# Patient Record
Sex: MALE | Race: WHITE | Employment: UNEMPLOYED | URBAN - NONMETROPOLITAN AREA
[De-identification: names, ages, dates, MRNs, and addresses within clinical notes are randomized per-mention and may not be internally consistent; named-entity substitution may affect disease eponyms.]

---

## 2018-01-15 ENCOUNTER — HOSPITAL ENCOUNTER (OUTPATIENT)
Dept: MRI IMAGING | Age: 43
Discharge: HOME | End: 2018-01-15
Payer: COMMERCIAL

## 2018-01-15 DIAGNOSIS — M54.10 RADICULAR SYNDROME OF LOWER LIMBS: ICD-10-CM

## 2018-01-15 PROCEDURE — 72148 MRI LUMBAR SPINE W/O DYE: CPT

## 2019-12-08 ENCOUNTER — HOSPITAL ENCOUNTER (EMERGENCY)
Age: 44
Discharge: HOME | End: 2019-12-08
Attending: EMERGENCY MEDICINE
Payer: COMMERCIAL

## 2019-12-08 VITALS
SYSTOLIC BLOOD PRESSURE: 154 MMHG | TEMPERATURE: 97.7 F | OXYGEN SATURATION: 95 % | DIASTOLIC BLOOD PRESSURE: 104 MMHG | HEART RATE: 88 BPM | BODY MASS INDEX: 58.43 KG/M2 | RESPIRATION RATE: 20 BRPM | WEIGHT: 315 LBS

## 2019-12-08 DIAGNOSIS — N30.01 ACUTE CYSTITIS WITH HEMATURIA: Primary | ICD-10-CM

## 2019-12-08 LAB
AMORPHOUS: ABNORMAL
BACTERIA: ABNORMAL
BILIRUBIN URINE: NEGATIVE
BLOOD, URINE: ABNORMAL
CASTS UA: ABNORMAL /LPF
CHARACTER, URINE: CLEAR
COLOR: YELLOW
CRYSTALS, UA: ABNORMAL
EPITHELIAL CELLS, UA: ABNORMAL /HPF
GLUCOSE, URINE: NEGATIVE MG/DL
KETONES, URINE: NEGATIVE
LEUKOCYTE ESTERASE, URINE: NEGATIVE
MUCUS: ABNORMAL
NITRITE, URINE: NEGATIVE
PH UA: 6 (ref 5–9)
PROTEIN UA: NEGATIVE MG/DL
RBC UA: ABNORMAL /HPF
REFLEX TO URINE C & S: ABNORMAL
SPECIFIC GRAVITY UA: 1.02 (ref 1–1.03)
UROBILINOGEN, URINE: 0.2 EU/DL (ref 0–1)
WBC UA: ABNORMAL /HPF

## 2019-12-08 PROCEDURE — 87077 CULTURE AEROBIC IDENTIFY: CPT

## 2019-12-08 PROCEDURE — 81001 URINALYSIS AUTO W/SCOPE: CPT

## 2019-12-08 PROCEDURE — 87086 URINE CULTURE/COLONY COUNT: CPT

## 2019-12-08 PROCEDURE — 87186 SC STD MICRODIL/AGAR DIL: CPT

## 2019-12-08 PROCEDURE — 99283 EMERGENCY DEPT VISIT LOW MDM: CPT

## 2019-12-08 RX ORDER — SULFAMETHOXAZOLE AND TRIMETHOPRIM 800; 160 MG/1; MG/1
1 TABLET ORAL 2 TIMES DAILY
Qty: 14 TABLET | Refills: 0 | Status: SHIPPED | OUTPATIENT
Start: 2019-12-08 | End: 2019-12-15

## 2019-12-10 LAB
ORGANISM: ABNORMAL
URINE CULTURE REFLEX: ABNORMAL

## 2023-10-11 ENCOUNTER — TELEMEDICINE (OUTPATIENT)
Dept: UROLOGY | Age: 48
End: 2023-10-11
Payer: COMMERCIAL

## 2023-10-11 DIAGNOSIS — R97.20 ELEVATED PSA: Primary | ICD-10-CM

## 2023-10-11 DIAGNOSIS — N39.0 RECURRENT UTI: ICD-10-CM

## 2023-10-11 PROCEDURE — 99204 OFFICE O/P NEW MOD 45 MIN: CPT | Performed by: UROLOGY

## 2023-10-11 RX ORDER — MELOXICAM 15 MG/1
15 TABLET ORAL DAILY
COMMUNITY
Start: 2023-05-01

## 2023-10-11 RX ORDER — OLMESARTAN MEDOXOMIL AND HYDROCHLOROTHIAZIDE 40/25 40; 25 MG/1; MG/1
1 TABLET ORAL DAILY
COMMUNITY
Start: 2023-09-20

## 2023-10-11 RX ORDER — ROPINIROLE 0.5 MG/1
TABLET, FILM COATED ORAL
COMMUNITY
Start: 2023-09-20

## 2023-10-11 NOTE — PROGRESS NOTES
Eber Richards MD   Urology Clinic office Visit      Patient:  Nakul Tanner  YOB: 1975  Date: 10/11/2023    HISTORY OF PRESENT ILLNESS:   The patient is a 51 y.o. male who presents today for evaluation of the following problem(s):      1. Elevated PSA    2. Recurrent UTI           Overall the problem(s) : are worsening. Associated Symptoms: No dysuria, gross hematuria. Pain Severity:      Summary of old records: N/A  (Patient's old records, notes and chart reviewed and summarized above.)      Onset 9/2023  PSA 7.32  Severity is described as moderate to severe. The course of symptoms over time is insidious. Alleviating factors: none  Worsening factors: none  Lower urinary tract symptoms: mild  Hx of rec UTIs- has not had one since 2 years ago  Lost 100 lbs    I independently reviewed and verified the images and reports from:    No results found. Last several PSA's:  No results found for: \"PSA\"    Last total testosterone:  No results found for: \"TESTOSTERONE\"    Urinalysis today:  No results found for this visit on 10/11/23. Last BUN and creatinine:  Lab Results   Component Value Date    BUN 14 05/27/2015     Lab Results   Component Value Date    CREATININE 0.7 05/27/2015       Imaging Reviewed during this Office Visit:   (results were independently reviewed by physician and radiology report verified)    PAST MEDICAL, FAMILY AND SOCIAL HISTORY:  No past medical history on file. No past surgical history on file.   Family History   Problem Relation Age of Onset    High Blood Pressure Mother     Diabetes Father     Diabetes Paternal Grandfather      Outpatient Medications Marked as Taking for the 10/11/23 encounter (Telemedicine) with Sheldon Thompson MD   Medication Sig Dispense Refill    olmesartan-hydroCHLOROthiazide (BENICAR HCT) 40-25 MG per tablet Take 1 tablet by mouth daily      rOPINIRole (REQUIP) 0.5 MG tablet take 1/2 tablet by mouth at bedtime --MAY INCREASE TO A FULL

## 2023-11-09 ENCOUNTER — TELEMEDICINE (OUTPATIENT)
Dept: UROLOGY | Age: 48
End: 2023-11-09
Payer: COMMERCIAL

## 2023-11-09 DIAGNOSIS — R97.20 ELEVATED PSA: Primary | ICD-10-CM

## 2023-11-09 PROCEDURE — 99442 PR PHYS/QHP TELEPHONE EVALUATION 11-20 MIN: CPT

## 2023-11-09 NOTE — PROGRESS NOTES
2023    TELEHEALTH EVALUATION -- Audio    HPI:    Jay Jay Escamilla (:  1975) has requested an audio/video evaluation for the following concern(s): Elevated PSA     Mr. Ayah Correia is a 41-year-old male that presents in follow-up for elevated PSA and recurrent UTI. Patient presented as a new patient in for a PSA of 7.32 on 2023. Repeat PSA on 10/26/2023 of 0.69. He does also endorse mild lower urinary tract symptoms. Mr. Ayah Correia does have a history of recurrent UTIs. However he does note that he has not had 1 in the past 2 years    Testosterone levels drawn in 2023. Patient with low testosterone at 210. Currently denies fever, chills, hematuria, and dysuria. Review of Systems   Constitutional:  Negative for chills and fever. Genitourinary:  Negative for dysuria and hematuria. Allergic/Immunologic: Negative for environmental allergies and food allergies. Psychiatric/Behavioral:  Negative for agitation, behavioral problems and confusion. Prior to Visit Medications    Medication Sig Taking? Authorizing Provider   olmesartan-hydroCHLOROthiazide (BENICAR HCT) 40-25 MG per tablet Take 1 tablet by mouth daily  ProviderNadine MD   rOPINIRole (REQUIP) 0.5 MG tablet take 1/2 tablet by mouth at bedtime --MAY INCREASE TO A FULL TABL. ..  (REFER TO PRESCRIPTION NOTES). ProviderNadine MD   meloxicam (MOBIC) 15 MG tablet Take 1 tablet by mouth daily  ProviderNadine MD       Social History     Tobacco Use    Smoking status: Never    Smokeless tobacco: Never   Substance Use Topics    Alcohol use: No     Comment: soc.     Drug use: No      Physical Exam      PHYSICAL EXAMINATION:  [ INSTRUCTIONS:  \"[x]\" Indicates a positive item  \"[]\" Indicates a negative item  -- DELETE ALL ITEMS NOT EXAMINED]  Vital Signs: (As obtained by patient/caregiver or practitioner observation)    Blood pressure-  Heart rate-    Respiratory rate-    Temperature-  Pulse oximetry-     Constitutional:

## 2023-11-10 ENCOUNTER — TELEPHONE (OUTPATIENT)
Dept: UROLOGY | Age: 48
End: 2023-11-10

## 2023-11-10 DIAGNOSIS — R68.82 LOW LIBIDO: Primary | ICD-10-CM

## 2023-11-10 NOTE — TELEPHONE ENCOUNTER
We did not discuss testosterone replacement at televist.    Needs Hgb and hematocrit, as well a repeat Testosterone level (prefer to have evidence of 2 low testosterone levels prior to replacement)

## 2023-11-10 NOTE — TELEPHONE ENCOUNTER
Susann Boeck from [address] office [phone] is calling asking for approval for that office to order testosterone patches prior to prescribing. Patient wanted that office to order the patches.

## 2023-11-16 LAB — PROSTATE SPECIFIC ANTIGEN: NORMAL

## 2023-11-30 ENCOUNTER — OFFICE VISIT (OUTPATIENT)
Dept: UROLOGY | Age: 48
End: 2023-11-30
Payer: COMMERCIAL

## 2023-11-30 VITALS — HEIGHT: 76 IN | BODY MASS INDEX: 38.36 KG/M2 | RESPIRATION RATE: 16 BRPM | WEIGHT: 315 LBS

## 2023-11-30 DIAGNOSIS — Z51.81 THERAPEUTIC DRUG MONITORING: ICD-10-CM

## 2023-11-30 DIAGNOSIS — E29.1 MALE HYPOGONADISM: Primary | ICD-10-CM

## 2023-11-30 PROCEDURE — 99214 OFFICE O/P EST MOD 30 MIN: CPT

## 2023-11-30 RX ORDER — TESTOSTERONE 16.2 MG/G
1 GEL TRANSDERMAL DAILY
Qty: 1 G | Refills: 0 | Status: SHIPPED | OUTPATIENT
Start: 2023-11-30 | End: 2023-12-30

## 2023-11-30 NOTE — PROGRESS NOTES
[address] [facility] [address]  [address] [address]  Dept: [phone]  Loc: [phone]    Visit Date: 11/30/2023        HPI:     HPI  Mr. Sara Suero is a 58-year-old male that presents in follow-up for elevated PSA, recurrent UTI, and male hypogonadism. Patient presented as a new patient in for a PSA of 7.32 on 9/11/2023. Repeat PSA on 10/26/2023 of 0.69. He does also endorse mild lower urinary tract symptoms. Mr. Sara Suero does have a history of recurrent UTIs. However he does note that he has not had 1 in the past 2 years     Testosterone levels drawn in 9/2023. Patient with low testosterone at 210. Repeat testosterone on 11/13/2023 at 207. Hemoglobin 14.9 and hematocrit 44.7 in 11/2023. AST 12 and ALT 18 in 9/2023. PSA at 0.69 10/2023. Notes mood swings and low libido. Reports weight loss of 110 lbs. Currently denies fever, chills, hematuria, and dysuria. Current Outpatient Medications   Medication Sig Dispense Refill    Testosterone (ANDROGEL) 20.25 MG/ACT (1.62%) GEL gel Place 1 actuation onto the skin daily for 30 days. Max Daily Amount: 20.25 mg 1 g 0    olmesartan-hydroCHLOROthiazide (BENICAR HCT) 40-25 MG per tablet Take 1 tablet by mouth daily      rOPINIRole (REQUIP) 0.5 MG tablet take 1/2 tablet by mouth at bedtime --MAY INCREASE TO A FULL TABL. ..  (REFER TO PRESCRIPTION NOTES). meloxicam (MOBIC) 15 MG tablet Take 1 tablet by mouth daily       No current facility-administered medications for this visit. Past Medical History  Edward De La Cruz  has no past medical history on file. Past Surgical History  The patient  has no past surgical history on file. Family History  This patient's family history includes Diabetes in his father and paternal grandfather; High Blood Pressure in his mother. Social History  Edward De La Cruz  reports that he has never smoked.  He has never used smokeless tobacco. He reports that he

## 2023-11-30 NOTE — PATIENT INSTRUCTIONS
-Get blood work 2 weeks after starting gel. I will call with abnormal results.   -I will send script. -Follow-up in 4 weeks for a symptom check  -Call with questions, comments, or concerns. I recommend going to the ED for further evaluation if you develop fever, chills, nausea, vomiting, chest pain, SOB, or calf pain.

## 2024-01-08 DIAGNOSIS — E29.1 MALE HYPOGONADISM: ICD-10-CM

## 2024-01-08 NOTE — TELEPHONE ENCOUNTER
Rylan Kaur called requesting a refill on the following medications:  Requested Prescriptions     Pending Prescriptions Disp Refills    Testosterone (ANDROGEL) 20.25 MG/ACT (1.62%) GEL gel 1 g 0     Sig: Place 1 actuation onto the skin daily for 30 days. Max Daily Amount: 20.25 mg     Pharmacy verified:  [email or web address]      Date of last visit: 11/30/2023  Date of next visit (if applicable): 1/11/2024

## 2024-01-10 RX ORDER — TESTOSTERONE 16.2 MG/G
1 GEL TRANSDERMAL DAILY
Qty: 1 G | Refills: 0 | OUTPATIENT
Start: 2024-01-10 | End: 2024-02-09

## 2024-01-11 ENCOUNTER — SCHEDULED TELEPHONE ENCOUNTER (OUTPATIENT)
Dept: UROLOGY | Age: 49
End: 2024-01-11
Payer: COMMERCIAL

## 2024-01-11 ENCOUNTER — TELEPHONE (OUTPATIENT)
Dept: UROLOGY | Age: 49
End: 2024-01-11

## 2024-01-11 DIAGNOSIS — Z51.81 THERAPEUTIC DRUG MONITORING: Primary | ICD-10-CM

## 2024-01-11 PROCEDURE — 99442 PR PHYS/QHP TELEPHONE EVALUATION 11-20 MIN: CPT

## 2024-01-11 RX ORDER — SULFAMETHOXAZOLE AND TRIMETHOPRIM 400; 80 MG/1; MG/1
1 TABLET ORAL 2 TIMES DAILY
Qty: 20 TABLET | Refills: 0 | Status: SHIPPED | OUTPATIENT
Start: 2024-01-11 | End: 2024-01-21

## 2024-01-11 NOTE — TELEPHONE ENCOUNTER
Please notify Patient that he needs to get testosterone + lipids now. Will refill after I get the result.      PSA in 2 weeks after he finishes bactrim      I will order other labs when he follows up in 6-8 weeks.     I was hoping his testosterone would be available through the lab. However, it is not. I told the patient we would update him

## 2024-01-11 NOTE — TELEPHONE ENCOUNTER
[facility] in [place] is now through [facility] [phone]    They do not have an order for testosterone.    The last testosterone was 207 in November that was scanned in with [facility] & [facility] on 11/16/2023

## 2024-01-11 NOTE — PATIENT INSTRUCTIONS
Take Bactrim for 10 days and then re-check your PSA.  Get repeat testosterone and check lipids. You will need to fast for 8 hours.  Follow-up in 6-8 weeks. I will call with any abnormal results.

## 2024-01-11 NOTE — TELEPHONE ENCOUNTER
Patient's testosterone and PSA did not result?    Will need to order repeat labs as a patient started on Androgel is to have labs at day 14 and 28.    He has a televisit today. However, labs not in.     The patient should go to the ED should they develop fever, chills, nausea, vomiting, chest pain, SOB, calf pain, feelings of incomplete emptying, or should they otherwise feel they need evaluated

## 2024-01-11 NOTE — TELEPHONE ENCOUNTER
Do I need to call the patient or does he know to get the labs completed. I don't see labs ordered for 3 months. Which ones do you want ordered?

## 2024-01-11 NOTE — TELEPHONE ENCOUNTER
Patient will have the testosterone and lipids completed on Monday when the lab is open and repeat the PSA 2 weeks after finishing the bactrim.    Orders faxed to [facility]. He is aware refills will be completed after labs are resulted.

## 2024-01-11 NOTE — TELEPHONE ENCOUNTER
Patient needs to get testosterone + lipids now. Will refill after I get the result.     PSA in 2 weeks after he finishes bactrim    Will otherwise repeat labs in 3 months    The patient should go to the ED should they develop fever, chills, nausea, vomiting, chest pain, SOB, calf pain, feelings of incomplete emptying, or should they otherwise feel they need evaluated

## 2024-01-11 NOTE — PROGRESS NOTES
1/11/2024    TELEHEALTH EVALUATION -- Audio    HPI:  Mr. Kaur is a 48-year-old male that presents in follow-up for elevated PSA, recurrent UTI, and male hypogonadism.     Patient presented as a new patient for a PSA of 7.32 on 9/11/2023.  Repeat PSA on 10/26/2023 of 0.69. He does also endorse mild lower urinary tract symptoms.      Mr. Kaur does have a history of recurrent UTIs.  However he does note that he has not had 1 in the past 2 years     Testosterone levels drawn in 9/2023. Patient with low testosterone at 210. Repeat testosterone on 11/13/2023 at 207. Hemoglobin 14.7 and hematocrit 45.4 in 12/2023. AST 15 and ALT 17 in 12/2023.   PSA now 1.05. However, reports improvement in symptomatology.        Review of Systems   Constitutional:  Negative for chills and fever.   Genitourinary:  Negative for dysuria and hematuria.       Prior to Visit Medications    Medication Sig Taking? Authorizing Provider   Testosterone (ANDROGEL) 20.25 MG/ACT (1.62%) GEL gel Place 1 actuation onto the skin daily for 30 days. Max Daily Amount: 20.25 mg  Juany Pittman PA-C   olmesartan-hydroCHLOROthiazide (BENICAR HCT) 40-25 MG per tablet Take 1 tablet by mouth daily  Nadine Gleason MD   rOPINIRole (REQUIP) 0.5 MG tablet take 1/2 tablet by mouth at bedtime --MAY INCREASE TO A FULL TABL...  (REFER TO PRESCRIPTION NOTES).  Nadine Gleason MD   meloxicam (MOBIC) 15 MG tablet Take 1 tablet by mouth daily  Nadine Gleason MD       Social History     Tobacco Use    Smoking status: Never    Smokeless tobacco: Never   Substance Use Topics    Alcohol use: No     Comment: soc.    Drug use: No          ASSESSMENT:  Elevated PSA  Recurrent UTI  Male Hypogonadism   Intentional Weight Loss      PLAN:    Elevated PSA: PSA of 7.32 in 10/2023. Decreased to 0.69 when repeated. Now back up to 1.05.    Recurrent UTI: No UTI over the past 2 years.   Male hypogonadism: Taking Androgel  Intentional Weight Loss: Reports 110 lb

## 2024-01-31 LAB — PROSTATE SPECIFIC ANTIGEN: 0.7 NG/ML

## 2024-02-26 ENCOUNTER — TELEPHONE (OUTPATIENT)
Dept: UROLOGY | Age: 49
End: 2024-02-26

## 2024-02-26 DIAGNOSIS — Z51.81 THERAPEUTIC DRUG MONITORING: Primary | ICD-10-CM

## 2024-02-26 NOTE — TELEPHONE ENCOUNTER
Do for 3 month labs.   Will place orders now. If he can get them done prior to appt, that would be great.     Is he still taking the medication? It is .     The patient should go to the ED should they develop fever, chills, nausea, vomiting, chest pain, SOB, calf pain, feelings of incomplete emptying, or should they otherwise feel they need evaluated

## 2024-02-26 NOTE — PROGRESS NOTES
[facility] [place] [facility]  [facility]  [address]  [address]  [place] [address]  Dept: [phone]  Loc: [phone]    Visit Date: 2/28/2024        HPI:     HPI  Mr. Kaur is a 49-year-old male that presents in follow-up for elevated PSA, recurrent UTI, and male hypogonadism.     Patient presented as a new patient for a PSA of 7.32 on 9/11/2023. Repeat PSA of 0.70 as of 1/31/2024. PSA has increased on a couple of instances, but returned to normal with abx therapy. He does also endorse mild lower urinary tract symptoms.      Mr. Kaur does have a history of recurrent UTIs.  However, he does note that he has not had an infection in the past 2 years     Testosterone levels drawn in 9/2023. Patient with low testosterone at 210. Repeat testosterone on 11/13/2023 at 207.  Hemoglobin 14.7 and hematocrit 45.4 in 12/2023. AST 15 and ALT 17 in 12/2023. Free testosterone in 1/2024 of 28. Lipid panel without abnormal findings in 1/2024. Androgel has offered improvement in symptomatology.       Current Outpatient Medications   Medication Sig Dispense Refill    Testosterone (ANDROGEL) 20.25 MG/ACT (1.62%) GEL gel Place 1 actuation onto the skin daily for 49 days. Max Daily Amount: 20.25 mg 1 g 0    olmesartan-hydroCHLOROthiazide (BENICAR HCT) 40-25 MG per tablet Take 1 tablet by mouth daily      rOPINIRole (REQUIP) 0.5 MG tablet take 1/2 tablet by mouth at bedtime --MAY INCREASE TO A FULL TABL...  (REFER TO PRESCRIPTION NOTES).      meloxicam (MOBIC) 15 MG tablet Take 1 tablet by mouth daily       No current facility-administered medications for this visit.       Past Medical History  Rylan  has no past medical history on file.    Past Surgical History  The patient  has no past surgical history on file.    Family History  This patient's family history includes Diabetes in his father and paternal grandfather; High Blood Pressure in his mother.    Social History  Rylan  reports that

## 2024-02-26 NOTE — TELEPHONE ENCOUNTER
Repeat if still taking testosterone. Otherwise, will discuss future steps at upcoming appt    The patient should go to the ED should they develop fever, chills, nausea, vomiting, chest pain, SOB, calf pain, feelings of incomplete emptying, or should they otherwise feel they need evaluated

## 2024-02-28 ENCOUNTER — OFFICE VISIT (OUTPATIENT)
Dept: UROLOGY | Age: 49
End: 2024-02-28
Payer: COMMERCIAL

## 2024-02-28 VITALS — HEIGHT: 75 IN | BODY MASS INDEX: 39.17 KG/M2 | RESPIRATION RATE: 18 BRPM | WEIGHT: 315 LBS

## 2024-02-28 DIAGNOSIS — E29.1 MALE HYPOGONADISM: ICD-10-CM

## 2024-02-28 DIAGNOSIS — Z51.81 THERAPEUTIC DRUG MONITORING: Primary | ICD-10-CM

## 2024-02-28 PROCEDURE — 99214 OFFICE O/P EST MOD 30 MIN: CPT

## 2024-02-28 RX ORDER — TESTOSTERONE 16.2 MG/G
1 GEL TRANSDERMAL DAILY
Qty: 1 G | Refills: 0 | Status: SHIPPED | OUTPATIENT
Start: 2024-02-28 | End: 2024-04-17

## 2024-02-28 NOTE — PATIENT INSTRUCTIONS
Get blood work in 2 weeks  Call with questions, comments, or concerns. I recommend going to the ED for further evaluation if you develop fever, chills, nausea, vomiting, chest pain, SOB, or calf pain.

## 2024-03-14 LAB
A/G RATIO: 1.2
ALBUMIN SERPL-MCNC: 4.2 G/DL
ALP BLD-CCNC: 64 U/L
ALT SERPL-CCNC: 20 U/L
AST SERPL-CCNC: 18 U/L
BILIRUB SERPL-MCNC: 0.8 MG/DL (ref 0.1–1.4)
BILIRUBIN DIRECT: 0.1 MG/DL
BILIRUBIN, INDIRECT: 0.7
CHOLESTEROL, TOTAL: 160 MG/DL
CHOLESTEROL/HDL RATIO: NORMAL
GLOBULIN: 3.6
HDLC SERPL-MCNC: 43 MG/DL (ref 35–70)
LDL CHOLESTEROL CALCULATED: 100 MG/DL (ref 0–160)
NONHDLC SERPL-MCNC: NORMAL MG/DL
PROTEIN TOTAL: 7.8 G/DL
TESTOSTERONE TOTAL: 427
TRIGL SERPL-MCNC: 81 MG/DL
VLDLC SERPL CALC-MCNC: NORMAL MG/DL

## 2024-03-22 ENCOUNTER — TELEPHONE (OUTPATIENT)
Dept: UROLOGY | Age: 49
End: 2024-03-22

## 2024-03-22 DIAGNOSIS — Z51.81 THERAPEUTIC DRUG MONITORING: Primary | ICD-10-CM

## 2024-03-22 NOTE — TELEPHONE ENCOUNTER
Patient advised of the lab results and recommendations. He voiced understanding and orders sent via mail.

## 2024-03-22 NOTE — TELEPHONE ENCOUNTER
Review labs.     Repeat in 2 weeks. Then will check every 3-6 months per guidelines      The patient should go to the ED should they develop fever, chills, nausea, vomiting, chest pain, SOB, calf pain, feelings of incomplete emptying, or should they otherwise feel they need evaluated

## 2024-04-04 ENCOUNTER — NURSE ONLY (OUTPATIENT)
Dept: LAB | Age: 49
End: 2024-04-04

## 2024-04-04 DIAGNOSIS — Z51.81 THERAPEUTIC DRUG MONITORING: ICD-10-CM

## 2024-04-04 LAB
CHOLEST SERPL-MCNC: 133 MG/DL (ref 100–199)
HDLC SERPL-MCNC: 40 MG/DL
LDLC SERPL CALC-MCNC: 79 MG/DL
TRIGL SERPL-MCNC: 70 MG/DL (ref 0–199)

## 2024-04-05 ENCOUNTER — TELEPHONE (OUTPATIENT)
Dept: UROLOGY | Age: 49
End: 2024-04-05

## 2024-04-06 LAB — TESTOSTERONE FREE: NORMAL

## 2024-04-10 ENCOUNTER — TELEPHONE (OUTPATIENT)
Dept: UROLOGY | Age: 49
End: 2024-04-10

## 2024-04-10 DIAGNOSIS — Z51.81 THERAPEUTIC DRUG MONITORING: Primary | ICD-10-CM

## 2024-04-10 DIAGNOSIS — E29.1 MALE HYPOGONADISM: ICD-10-CM

## 2024-04-10 DIAGNOSIS — R68.82 LOW LIBIDO: ICD-10-CM

## 2024-04-10 NOTE — TELEPHONE ENCOUNTER
Did patient get hepatic function panel and H&H drawn on 4/4/2024?    Will otherwise need repeat H&H in 3 months

## 2024-04-10 NOTE — TELEPHONE ENCOUNTER
Patient will have the labs drawn tomorrow. He will need a refill for testosterone before Saturday.    Do you want to reorder the labs in 3 months now or after you get the labs back tomorrow?

## 2024-04-11 ENCOUNTER — NURSE ONLY (OUTPATIENT)
Dept: LAB | Age: 49
End: 2024-04-11

## 2024-04-11 DIAGNOSIS — Z51.81 THERAPEUTIC DRUG MONITORING: ICD-10-CM

## 2024-04-11 DIAGNOSIS — E29.1 MALE HYPOGONADISM: ICD-10-CM

## 2024-04-11 DIAGNOSIS — R68.82 LOW LIBIDO: ICD-10-CM

## 2024-04-11 DIAGNOSIS — Z51.81 THERAPEUTIC DRUG MONITORING: Primary | ICD-10-CM

## 2024-04-11 LAB
ALBUMIN SERPL BCG-MCNC: 4.1 G/DL (ref 3.5–5.1)
ALP SERPL-CCNC: 72 U/L (ref 38–126)
ALT SERPL W/O P-5'-P-CCNC: 14 U/L (ref 11–66)
AST SERPL-CCNC: 14 U/L (ref 5–40)
BILIRUB CONJ SERPL-MCNC: < 0.2 MG/DL (ref 0–0.3)
BILIRUB SERPL-MCNC: 0.5 MG/DL (ref 0.3–1.2)
HCT VFR BLD AUTO: 48.8 % (ref 42–52)
HGB BLD-MCNC: 15.8 GM/DL (ref 14–18)
PROT SERPL-MCNC: 7.4 G/DL (ref 6.1–8)

## 2024-04-11 NOTE — TELEPHONE ENCOUNTER
Rylan Kaur called requesting a refill on the following medications:  Requested Prescriptions     Pending Prescriptions Disp Refills    Testosterone (ANDROGEL) 20.25 MG/ACT (1.62%) GEL gel 1 g 0     Sig: Place 1 actuation onto the skin daily for 49 days. Max Daily Amount: 20.25 mg     Pharmacy verified:  [email or web address]      Date of last visit: 02/28/2024  Date of next visit (if applicable): 8/26/2024

## 2024-04-12 RX ORDER — TESTOSTERONE 16.2 MG/G
1 GEL TRANSDERMAL DAILY
Qty: 1 G | Refills: 0 | Status: SHIPPED | OUTPATIENT
Start: 2024-04-12 | End: 2024-05-31

## 2024-04-12 RX ORDER — TESTOSTERONE 20.25 MG/1.25G
GEL TOPICAL
Qty: 75 G | OUTPATIENT
Start: 2024-04-12

## 2024-04-12 NOTE — TELEPHONE ENCOUNTER
Patient advised the prescription was sent to the pharmacy and to repeat the labs in 3 months. Orders sent via mail. He voiced understanding.

## 2024-04-12 NOTE — TELEPHONE ENCOUNTER
Rylan Kaur called requesting a refill on the following medications:  Requested Prescriptions     Pending Prescriptions Disp Refills    Testosterone 1.62 % GEL [Pharmacy Med Name: TESTOSTERONE 1.62% GEL PUMP] 75 g      Sig: PLACE 1 ACTUATION ONTO THE SKIN DAILY FOR 49 DAYS     Pharmacy verified:  [email or web address]      Date of last visit: 02/28/2024  Date of next visit (if applicable): 8/26/2024

## 2024-04-12 NOTE — TELEPHONE ENCOUNTER
Sent.     Labs in approximately 3 months.     F/u as scheduled    The patient should go to the ED should they develop fever, chills, nausea, vomiting, chest pain, SOB, calf pain, feelings of incomplete emptying, or should they otherwise feel they need evaluated

## 2024-06-24 DIAGNOSIS — E29.1 MALE HYPOGONADISM: ICD-10-CM

## 2024-06-24 RX ORDER — TESTOSTERONE 16.2 MG/G
1 GEL TRANSDERMAL DAILY
Qty: 1 G | Refills: 0 | Status: SHIPPED | OUTPATIENT
Start: 2024-06-24 | End: 2024-08-12

## 2024-06-24 NOTE — TELEPHONE ENCOUNTER
Refill sent.     Patient to get labs as ordered prior to his appt    The patient should go to the ED should they develop fever, chills, nausea, vomiting, chest pain, SOB, calf pain, feelings of incomplete emptying, or should they otherwise feel they need evaluated

## 2024-08-16 ENCOUNTER — LAB (OUTPATIENT)
Dept: LAB | Age: 49
End: 2024-08-16

## 2024-08-16 DIAGNOSIS — E29.1 MALE HYPOGONADISM: ICD-10-CM

## 2024-08-16 DIAGNOSIS — Z51.81 THERAPEUTIC DRUG MONITORING: ICD-10-CM

## 2024-08-16 LAB
ALBUMIN SERPL BCG-MCNC: 3.9 G/DL (ref 3.5–5.1)
ALP SERPL-CCNC: 77 U/L (ref 38–126)
ALT SERPL W/O P-5'-P-CCNC: 14 U/L (ref 11–66)
ANION GAP SERPL CALC-SCNC: 14 MEQ/L (ref 8–16)
AST SERPL-CCNC: 13 U/L (ref 5–40)
BILIRUB CONJ SERPL-MCNC: 0.2 MG/DL (ref 0.1–13.8)
BILIRUB SERPL-MCNC: 0.5 MG/DL (ref 0.3–1.2)
BUN SERPL-MCNC: 20 MG/DL (ref 7–22)
CALCIUM SERPL-MCNC: 8.9 MG/DL (ref 8.5–10.5)
CHLORIDE SERPL-SCNC: 102 MEQ/L (ref 98–111)
CHOLEST SERPL-MCNC: 122 MG/DL (ref 100–199)
CO2 SERPL-SCNC: 24 MEQ/L (ref 23–33)
CREAT SERPL-MCNC: 0.7 MG/DL (ref 0.4–1.2)
GFR SERPL CREATININE-BSD FRML MDRD: > 90 ML/MIN/1.73M2
GLUCOSE SERPL-MCNC: 99 MG/DL (ref 70–108)
HCT VFR BLD AUTO: 48.9 % (ref 42–52)
HDLC SERPL-MCNC: 45 MG/DL
HGB BLD-MCNC: 16 GM/DL (ref 14–18)
LDLC SERPL CALC-MCNC: 64 MG/DL
POTASSIUM SERPL-SCNC: 4.2 MEQ/L (ref 3.5–5.2)
PROT SERPL-MCNC: 7.3 G/DL (ref 6.1–8)
PSA SERPL-MCNC: 0.77 NG/ML (ref 0–1)
SODIUM SERPL-SCNC: 140 MEQ/L (ref 135–145)
TRIGL SERPL-MCNC: 65 MG/DL (ref 0–199)

## 2024-08-19 LAB — TESTOSTERONE FREE: NORMAL

## 2024-08-26 ENCOUNTER — TELEMEDICINE (OUTPATIENT)
Dept: UROLOGY | Age: 49
End: 2024-08-26
Payer: COMMERCIAL

## 2024-08-26 DIAGNOSIS — E29.1 MALE HYPOGONADISM: ICD-10-CM

## 2024-08-26 PROCEDURE — 99214 OFFICE O/P EST MOD 30 MIN: CPT

## 2024-08-26 RX ORDER — TESTOSTERONE 1.62 MG/G
1 GEL TRANSDERMAL DAILY
Qty: 1 G | Refills: 0 | Status: SHIPPED | OUTPATIENT
Start: 2024-08-26 | End: 2024-10-14

## 2024-08-26 NOTE — PROGRESS NOTES
8/26/2024    TELEHEALTH EVALUATION -- Audio/Visual    HPI:    Mr. Kaur is a 49-year-old male that presents in follow-up.    Patient presented as a new patient for a PSA of 7.32 on 9/11/2023. Repeat PSA of 0.77 as of 8/2024. PSA has increased on a couple of instances, but returned to normal with abx therapy. He does also endorse mild lower urinary tract symptoms.      Mr. Kaur does have a history of recurrent UTIs.  However, he does note that he has not had an infection in the past 2 years     Testosterone levels drawn in 8/2024.  Testosterone level at the low end of normal at 305. Hemoglobin 16.0 and hematocrit 48.9 in 12/2023. AST 13 and ALT 14. Lipid panel without abnormal findings. PSA is stable. Androgel has offered improvement in symptomatology.        Review of Systems   Genitourinary:  Negative for dysuria and hematuria.       Prior to Visit Medications    Medication Sig Taking? Authorizing Provider   Testosterone (ANDROGEL) 20.25 MG/ACT (1.62%) GEL gel Place 1 actuation onto the skin daily for 49 days. Max Daily Amount: 20.25 mg  Juany Pittman PA-C   olmesartan-hydroCHLOROthiazide (BENICAR HCT) 40-25 MG per tablet Take 1 tablet by mouth daily  Nadine Gleason MD   rOPINIRole (REQUIP) 0.5 MG tablet take 1/2 tablet by mouth at bedtime --MAY INCREASE TO A FULL TABL...  (REFER TO PRESCRIPTION NOTES).  Nadine Gleason MD   meloxicam (MOBIC) 15 MG tablet Take 1 tablet by mouth daily  Nadine Gleason MD       Social History     Tobacco Use    Smoking status: Never    Smokeless tobacco: Never   Substance Use Topics    Alcohol use: No     Comment: soc.    Drug use: No            PHYSICAL EXAMINATION:  [ INSTRUCTIONS:  \"[x]\" Indicates a positive item  \"[]\" Indicates a negative item  -- DELETE ALL ITEMS NOT EXAMINED]  Vital Signs: (As obtained by patient/caregiver or practitioner observation)    Constitutional: [x] Appears well-developed and well-nourished [x] No apparent distress      []

## 2024-11-01 DIAGNOSIS — E29.1 MALE HYPOGONADISM: ICD-10-CM

## 2024-11-01 RX ORDER — TESTOSTERONE 1.62 MG/G
1 GEL TRANSDERMAL DAILY
Qty: 1 G | Refills: 0 | Status: CANCELLED | OUTPATIENT
Start: 2024-11-01 | End: 2024-12-20

## 2024-11-05 RX ORDER — TESTOSTERONE 1.62 MG/G
1 GEL TRANSDERMAL DAILY
Qty: 1 G | Refills: 0 | Status: SHIPPED | OUTPATIENT
Start: 2024-11-05 | End: 2024-12-24

## 2024-11-05 NOTE — TELEPHONE ENCOUNTER
Refill sent    Pt to f/u as scheduled with labs prior to the appt    The patient should go to the ED should they develop fever, chills, nausea, vomiting, chest pain, SOB, calf pain, feelings of incomplete emptying, or should they otherwise feel they need evaluated

## 2024-11-05 NOTE — TELEPHONE ENCOUNTER
Rylan Kaur called requesting a refill on the following medications:  Requested Prescriptions     Pending Prescriptions Disp Refills    Testosterone (ANDROGEL) 20.25 MG/ACT (1.62%) GEL gel 1 g 0     Sig: Place 1 actuation onto the skin daily for 49 days. Max Daily Amount: 20.25 mg     Pharmacy verified:  [email or web address]      Date of last visit: 08/26/2024  Date of next visit (if applicable): 2/24/2025

## 2025-01-29 DIAGNOSIS — E29.1 MALE HYPOGONADISM: ICD-10-CM

## 2025-01-29 RX ORDER — TESTOSTERONE 1.62 MG/G
1 GEL TRANSDERMAL DAILY
Qty: 1 G | Refills: 0 | Status: SHIPPED | OUTPATIENT
Start: 2025-01-29 | End: 2025-03-19

## 2025-01-29 NOTE — TELEPHONE ENCOUNTER
Rylan Kaur called requesting a refill on the following medications:  Requested Prescriptions     Pending Prescriptions Disp Refills    Testosterone (ANDROGEL) 20.25 MG/ACT (1.62%) GEL gel 1 g 0     Sig: Place 1 actuation onto the skin daily for 49 days. Max Daily Amount: 20.25 mg     Pharmacy verified:[facility]  [email or web address]      Date of last visit: 2/28/24  Date of next visit (if applicable): 2/24/2025

## 2025-01-29 NOTE — TELEPHONE ENCOUNTER
Sent  Please schedule in office visit in 2/2025  Can be in [facility] prior      The patient should go to the ED should they develop fever, chills, nausea, vomiting, chest pain, SOB, calf pain, feelings of incomplete emptying, or should they otherwise feel they need evaluated    Please have the patient follow-up as scheduled or sooner if needed

## 2025-02-26 ENCOUNTER — LAB (OUTPATIENT)
Dept: LAB | Age: 50
End: 2025-02-26

## 2025-02-26 LAB
ANION GAP SERPL CALC-SCNC: 15 MEQ/L (ref 8–16)
BUN SERPL-MCNC: 24 MG/DL (ref 8–23)
CALCIUM SERPL-MCNC: 9.5 MG/DL (ref 8.2–9.6)
CHLORIDE SERPL-SCNC: 99 MEQ/L (ref 98–111)
CO2 SERPL-SCNC: 26 MEQ/L (ref 22–29)
CREAT SERPL-MCNC: 1.3 MG/DL (ref 0.7–1.2)
DEPRECATED RDW RBC AUTO: 46.9 FL (ref 35–45)
ERYTHROCYTE [DISTWIDTH] IN BLOOD BY AUTOMATED COUNT: 13.2 % (ref 11.5–14.5)
GFR SERPL CREATININE-BSD FRML MDRD: 67 ML/MIN/1.73M2
GLUCOSE SERPL-MCNC: 128 MG/DL (ref 74–109)
HCT VFR BLD AUTO: 49.6 % (ref 42–52)
HGB BLD-MCNC: 16.3 GM/DL (ref 14–18)
MCH RBC QN AUTO: 31.4 PG (ref 26–33)
MCHC RBC AUTO-ENTMCNC: 32.9 GM/DL (ref 32.2–35.5)
MCV RBC AUTO: 95.6 FL (ref 80–94)
PLATELET # BLD AUTO: 270 THOU/MM3 (ref 130–400)
PMV BLD AUTO: 10.8 FL (ref 9.4–12.4)
POTASSIUM SERPL-SCNC: 3.3 MEQ/L (ref 3.5–5.2)
PSA SERPL-MCNC: 0.97 NG/ML (ref 0–1)
RBC # BLD AUTO: 5.19 MILL/MM3 (ref 4.7–6.1)
SHBG SERPL-SCNC: 36 NMOL/L (ref 19–76)
SODIUM SERPL-SCNC: 140 MEQ/L (ref 135–145)
TESTOST FREE MFR SERPL: 98.9 PG/ML (ref 47–244)
TESTOST SERPL-MCNC: 497 NG/DL (ref 193–740)
TSH SERPL DL<=0.05 MIU/L-ACNC: 2.12 UIU/ML (ref 0.27–4.2)
WBC # BLD AUTO: 11.4 THOU/MM3 (ref 4.8–10.8)

## 2025-06-02 DIAGNOSIS — E29.1 MALE HYPOGONADISM: ICD-10-CM

## 2025-06-02 RX ORDER — TESTOSTERONE 20.25 MG/1.25G
GEL TOPICAL
Qty: 75 G | Refills: 0 | OUTPATIENT
Start: 2025-06-02

## 2025-06-02 NOTE — TELEPHONE ENCOUNTER
Rylan Kaur called requesting a refill on the following medications:  Requested Prescriptions     Pending Prescriptions Disp Refills    Testosterone 1.62 % GEL [Pharmacy Med Name: TESTOSTERONE 1.62% GEL] 75 g 0     Sig: PLACE ONE (1) ACTUATION ONTO THE SKIN DAILY FOR 49 DAYS. MAX DAILY AMOUNT: 20.25 MG     Pharmacy verified:  [email or web address]      Date of last visit: 02/28/2024  Date of next visit (if applicable): Patient will call back to make the appointment

## 2025-07-24 ENCOUNTER — TRANSCRIBE ORDERS (OUTPATIENT)
Dept: ADMINISTRATIVE | Age: 50
End: 2025-07-24

## 2025-07-24 DIAGNOSIS — R91.8 MASS OF LOWER LOBE OF RIGHT LUNG: Primary | ICD-10-CM

## 2025-07-27 ENCOUNTER — HOSPITAL ENCOUNTER (OUTPATIENT)
Dept: CT IMAGING | Age: 50
Discharge: HOME | End: 2025-07-27
Payer: COMMERCIAL

## 2025-07-27 DIAGNOSIS — R91.8 MASS OF LOWER LOBE OF RIGHT LUNG: ICD-10-CM

## 2025-07-27 PROCEDURE — 71250 CT THORAX DX C-: CPT

## 2025-07-28 ENCOUNTER — OFFICE VISIT (OUTPATIENT)
Dept: CARDIOLOGY CLINIC | Age: 50
End: 2025-07-28
Payer: COMMERCIAL

## 2025-07-28 VITALS
HEIGHT: 76 IN | DIASTOLIC BLOOD PRESSURE: 77 MMHG | HEART RATE: 97 BPM | BODY MASS INDEX: 38.36 KG/M2 | WEIGHT: 315 LBS | SYSTOLIC BLOOD PRESSURE: 144 MMHG

## 2025-07-28 DIAGNOSIS — I10 ESSENTIAL HYPERTENSION: ICD-10-CM

## 2025-07-28 DIAGNOSIS — G47.33 OSA (OBSTRUCTIVE SLEEP APNEA): ICD-10-CM

## 2025-07-28 DIAGNOSIS — E78.2 MIXED HYPERLIPIDEMIA: ICD-10-CM

## 2025-07-28 DIAGNOSIS — E66.01 OBESITY, MORBID, BMI 40.0-49.9 (HCC): ICD-10-CM

## 2025-07-28 DIAGNOSIS — I49.3 VENTRICULAR ECTOPIC BEATS: ICD-10-CM

## 2025-07-28 DIAGNOSIS — R06.02 SHORTNESS OF BREATH: ICD-10-CM

## 2025-07-28 DIAGNOSIS — Z01.810 PREOPERATIVE CARDIOVASCULAR EXAMINATION: Primary | ICD-10-CM

## 2025-07-28 DIAGNOSIS — I25.10 CORONARY ARTERY CALCIFICATION: ICD-10-CM

## 2025-07-28 PROCEDURE — 3077F SYST BP >= 140 MM HG: CPT | Performed by: INTERNAL MEDICINE

## 2025-07-28 PROCEDURE — 99204 OFFICE O/P NEW MOD 45 MIN: CPT | Performed by: INTERNAL MEDICINE

## 2025-07-28 PROCEDURE — 93000 ELECTROCARDIOGRAM COMPLETE: CPT | Performed by: INTERNAL MEDICINE

## 2025-07-28 PROCEDURE — 3078F DIAST BP <80 MM HG: CPT | Performed by: INTERNAL MEDICINE

## 2025-07-28 RX ORDER — AMLODIPINE BESYLATE 10 MG/1
10 TABLET ORAL DAILY
Qty: 90 TABLET | Refills: 3 | Status: SHIPPED | OUTPATIENT
Start: 2025-07-28

## 2025-07-28 RX ORDER — MAGNESIUM OXIDE/MAG AA CHELATE 300 MG
CAPSULE ORAL
COMMUNITY

## 2025-07-28 RX ORDER — ROSUVASTATIN CALCIUM 20 MG/1
20 TABLET, COATED ORAL DAILY
Qty: 90 TABLET | Refills: 3 | Status: SHIPPED | OUTPATIENT
Start: 2025-07-28

## 2025-07-28 NOTE — PROGRESS NOTES
Cardiology Clinic Consultation            Reason for the Consult     Preoperative cardiovascular risk stratification     History of the Present Illness     50-year-old male with past medical history of essential hypertension, LISETH, obesity with BMI 43, coronary calcifications, restless leg syndrome who presents to the clinic today for preoperative cardiovascular risk stratification prior to left total hip arthroplasty 8/8/2025.  EKG 5/27/2015: Normal sinus rhythm, low voltage QRS secondary to body habitus, no significant ST or T wave changes.  QTc: 427 ms.  Lipid panel 8/2024: Triglycerides: 65, LDL: 64    On today's visit, the patient reported that he has lost over 150 pounds over the last few years.  Denies any chest pain, shortness of breath at rest, however with limited functional capacity due to severe osteoarthritis.  Denies any palpitations, lightheadedness, dizziness or syncope.  Denies any prior diagnosis of heart disease.     Past Medical & Surgical History     essential hypertension, LISETH, obesity with BMI 50, coronary calcifications, restless leg syndrome     Social History     Social History     Socioeconomic History    Marital status:      Spouse name: Not on file    Number of children: Not on file    Years of education: Not on file    Highest education level: Not on file   Occupational History    Not on file   Tobacco Use    Smoking status: Never    Smokeless tobacco: Never   Substance and Sexual Activity    Alcohol use: No     Comment: soc.    Drug use: No    Sexual activity: Not on file   Other Topics Concern    Not on file   Social History Narrative    Not on file     Social Drivers of Health     Financial Resource Strain: Not on file   Food Insecurity: Not on file   Transportation Needs: Not on file   Physical Activity: Not on file   Stress: Not on file   Social Connections: Not on file   Intimate Partner Violence: Not on file   Housing Stability: Not on file        Family History

## 2025-07-28 NOTE — PROGRESS NOTES
New patient  Needs clearance for left hip replacement - lateral approach at Dr. Rocha at [facility] 8-8-25  Denies all cardiac symptoms  EKG done today

## 2025-07-29 ENCOUNTER — HOSPITAL ENCOUNTER (OUTPATIENT)
Age: 50
Discharge: HOME | End: 2025-07-31
Attending: INTERNAL MEDICINE
Payer: COMMERCIAL

## 2025-07-29 DIAGNOSIS — R06.02 SHORTNESS OF BREATH: ICD-10-CM

## 2025-07-29 LAB
ECHO AO ASC DIAM: 3.3 CM
ECHO AV CUSP MM: 1.9 CM
ECHO AV PEAK GRADIENT: 5 MMHG
ECHO AV PEAK VELOCITY: 1.1 M/S
ECHO AV VELOCITY RATIO: 0.73
ECHO EST RA PRESSURE: 3 MMHG
ECHO LA AREA 2C: 16.3 CM2
ECHO LA AREA 4C: 17.5 CM2
ECHO LA DIAMETER: 3 CM
ECHO LA MAJOR AXIS: 6.5 CM
ECHO LA MINOR AXIS: 6.1 CM
ECHO LA VOL BP: 38 ML (ref 18–58)
ECHO LA VOL MOD A2C: 35 ML (ref 18–58)
ECHO LA VOL MOD A4C: 38 ML (ref 18–58)
ECHO LV E' LATERAL VELOCITY: 12.3 CM/S
ECHO LV E' SEPTAL VELOCITY: 10.6 CM/S
ECHO LV EF PHYSICIAN: 55 %
ECHO LV FRACTIONAL SHORTENING: 33 % (ref 28–44)
ECHO LV INTERNAL DIMENSION DIASTOLIC: 5.2 CM (ref 4.2–5.9)
ECHO LV INTERNAL DIMENSION SYSTOLIC: 3.5 CM
ECHO LV ISOVOLUMETRIC RELAXATION TIME (IVRT): 81 MS
ECHO LV IVSD: 0.9 CM (ref 0.6–1)
ECHO LV MASS 2D: 181.4 G (ref 88–224)
ECHO LV POSTERIOR WALL DIASTOLIC: 1 CM (ref 0.6–1)
ECHO LV RELATIVE WALL THICKNESS RATIO: 0.38
ECHO LVOT PEAK GRADIENT: 3 MMHG
ECHO LVOT PEAK VELOCITY: 0.8 M/S
ECHO MV A VELOCITY: 0.62 M/S
ECHO MV E DECELERATION TIME (DT): 266 MS
ECHO MV E VELOCITY: 0.71 M/S
ECHO MV E/A RATIO: 1.15
ECHO MV E/E' LATERAL: 5.77
ECHO MV E/E' RATIO (AVERAGED): 6.24
ECHO MV E/E' SEPTAL: 6.7
ECHO PV MAX VELOCITY: 0.6 M/S
ECHO PV PEAK GRADIENT: 1 MMHG
ECHO RV INTERNAL DIMENSION: 3.5 CM
ECHO RV TAPSE: 1.7 CM (ref 1.7–?)
ECHO TV E WAVE: 0.5 M/S

## 2025-07-29 PROCEDURE — 93306 TTE W/DOPPLER COMPLETE: CPT | Performed by: INTERNAL MEDICINE

## 2025-07-29 PROCEDURE — 93306 TTE W/DOPPLER COMPLETE: CPT

## 2025-07-30 ENCOUNTER — APPOINTMENT (OUTPATIENT)
Dept: CT IMAGING | Age: 50
End: 2025-07-30
Attending: NURSE PRACTITIONER
Payer: COMMERCIAL

## 2025-08-04 ENCOUNTER — HOSPITAL ENCOUNTER (OUTPATIENT)
Dept: NUCLEAR MEDICINE | Age: 50
Discharge: HOME | End: 2025-08-04
Attending: INTERNAL MEDICINE
Payer: COMMERCIAL

## 2025-08-04 ENCOUNTER — HOSPITAL ENCOUNTER (OUTPATIENT)
Age: 50
Discharge: HOME | End: 2025-08-06
Attending: INTERNAL MEDICINE
Payer: COMMERCIAL

## 2025-08-04 VITALS — BODY MASS INDEX: 38.36 KG/M2 | WEIGHT: 315 LBS | HEIGHT: 76 IN

## 2025-08-04 DIAGNOSIS — I49.3 VENTRICULAR ECTOPIC BEATS: ICD-10-CM

## 2025-08-04 DIAGNOSIS — R06.02 SHORTNESS OF BREATH: ICD-10-CM

## 2025-08-04 LAB
ECHO BSA: 2.93 M2
ECHO BSA: 2.93 M2
STRESS BASELINE DIAS BP: 84 MMHG
STRESS BASELINE HR: 88 BPM
STRESS BASELINE SYS BP: 112 MMHG
STRESS ESTIMATED WORKLOAD: 1 METS
STRESS PEAK DIAS BP: 84 MMHG
STRESS PEAK SYS BP: 112 MMHG
STRESS PERCENT HR ACHIEVED: 72 %
STRESS POST PEAK HR: 121 BPM
STRESS RATE PRESSURE PRODUCT: NORMAL BPM*MMHG
STRESS STAGE 1 BP: NORMAL MMHG
STRESS STAGE 1 DURATION: 1 MIN:SEC
STRESS STAGE 1 HR: 108 BPM
STRESS STAGE 2 BP: NORMAL MMHG
STRESS STAGE 2 DURATION: 1 MIN:SEC
STRESS STAGE 2 HR: 117 BPM
STRESS STAGE 3 BP: NORMAL MMHG
STRESS STAGE 3 DURATION: 1 MIN:SEC
STRESS STAGE 3 HR: 115 BPM
STRESS STAGE RECOVERY 1 BP: NORMAL MMHG
STRESS STAGE RECOVERY 1 DURATION: 1 MIN:SEC
STRESS STAGE RECOVERY 1 HR: 109 BPM
STRESS STAGE RECOVERY 2 BP: NORMAL MMHG
STRESS STAGE RECOVERY 2 DURATION: 1 MIN:SEC
STRESS STAGE RECOVERY 2 HR: 104 BPM
STRESS STAGE RECOVERY 3 BP: NORMAL MMHG
STRESS STAGE RECOVERY 3 DURATION: 1 MIN:SEC
STRESS STAGE RECOVERY 3 HR: 115 BPM
STRESS STAGE RECOVERY 4 BP: NORMAL MMHG
STRESS STAGE RECOVERY 4 DURATION: 1 MIN:SEC
STRESS STAGE RECOVERY 4 HR: 112 BPM
STRESS TARGET HR: 169 BPM
TID: 1.09

## 2025-08-04 PROCEDURE — 93016 CV STRESS TEST SUPVJ ONLY: CPT | Performed by: INTERNAL MEDICINE

## 2025-08-04 PROCEDURE — 78452 HT MUSCLE IMAGE SPECT MULT: CPT

## 2025-08-04 PROCEDURE — 93017 CV STRESS TEST TRACING ONLY: CPT

## 2025-08-04 PROCEDURE — 93270 REMOTE 30 DAY ECG REV/REPORT: CPT

## 2025-08-04 PROCEDURE — A9500 TC99M SESTAMIBI: HCPCS | Performed by: INTERNAL MEDICINE

## 2025-08-04 PROCEDURE — 93018 CV STRESS TEST I&R ONLY: CPT | Performed by: INTERNAL MEDICINE

## 2025-08-04 PROCEDURE — 6360000002 HC RX W HCPCS: Performed by: INTERNAL MEDICINE

## 2025-08-04 PROCEDURE — 78452 HT MUSCLE IMAGE SPECT MULT: CPT | Performed by: INTERNAL MEDICINE

## 2025-08-04 PROCEDURE — 3430000000 HC RX DIAGNOSTIC RADIOPHARMACEUTICAL: Performed by: INTERNAL MEDICINE

## 2025-08-04 RX ORDER — TETRAKIS(2-METHOXYISOBUTYLISOCYANIDE)COPPER(I) TETRAFLUOROBORATE 1 MG/ML
32.2 INJECTION, POWDER, LYOPHILIZED, FOR SOLUTION INTRAVENOUS
Status: COMPLETED | OUTPATIENT
Start: 2025-08-04 | End: 2025-08-04

## 2025-08-04 RX ORDER — TETRAKIS(2-METHOXYISOBUTYLISOCYANIDE)COPPER(I) TETRAFLUOROBORATE 1 MG/ML
9.6 INJECTION, POWDER, LYOPHILIZED, FOR SOLUTION INTRAVENOUS
Status: COMPLETED | OUTPATIENT
Start: 2025-08-04 | End: 2025-08-04

## 2025-08-04 RX ORDER — REGADENOSON 0.08 MG/ML
0.4 INJECTION, SOLUTION INTRAVENOUS
Status: COMPLETED | OUTPATIENT
Start: 2025-08-04 | End: 2025-08-04

## 2025-08-04 RX ADMIN — REGADENOSON 0.4 MG: 0.08 INJECTION, SOLUTION INTRAVENOUS at 09:47

## 2025-08-04 RX ADMIN — Medication 32.2 MILLICURIE: at 09:42

## 2025-08-04 RX ADMIN — Medication 9.6 MILLICURIE: at 08:35

## 2025-08-11 ENCOUNTER — TELEPHONE (OUTPATIENT)
Dept: CARDIOLOGY CLINIC | Age: 50
End: 2025-08-11

## 2025-08-11 DIAGNOSIS — I49.3 FREQUENT PVCS: Primary | ICD-10-CM

## 2025-08-11 LAB — ECHO BSA: 2.93 M2

## 2025-08-11 RX ORDER — METOPROLOL SUCCINATE 25 MG/1
25 TABLET, EXTENDED RELEASE ORAL DAILY
Qty: 90 TABLET | Refills: 3 | Status: SHIPPED | OUTPATIENT
Start: 2025-08-11 | End: 2025-08-11 | Stop reason: ALTCHOICE

## 2025-08-11 RX ORDER — METOPROLOL TARTRATE 25 MG/1
25 TABLET, FILM COATED ORAL 2 TIMES DAILY
Qty: 180 TABLET | Refills: 3 | Status: SHIPPED | OUTPATIENT
Start: 2025-08-11

## 2025-08-28 ENCOUNTER — OFFICE VISIT (OUTPATIENT)
Dept: CARDIOLOGY CLINIC | Age: 50
End: 2025-08-28
Payer: COMMERCIAL

## 2025-08-28 VITALS
HEIGHT: 76 IN | DIASTOLIC BLOOD PRESSURE: 77 MMHG | WEIGHT: 315 LBS | SYSTOLIC BLOOD PRESSURE: 136 MMHG | HEART RATE: 86 BPM | BODY MASS INDEX: 38.36 KG/M2

## 2025-08-28 DIAGNOSIS — I49.3 FREQUENT PVCS: Primary | ICD-10-CM

## 2025-08-28 PROCEDURE — 99214 OFFICE O/P EST MOD 30 MIN: CPT | Performed by: INTERNAL MEDICINE

## 2025-08-28 RX ORDER — METOPROLOL TARTRATE 50 MG
50 TABLET ORAL 2 TIMES DAILY
Qty: 180 TABLET | Refills: 3 | Status: SHIPPED | OUTPATIENT
Start: 2025-08-28